# Patient Record
Sex: MALE | Race: WHITE | NOT HISPANIC OR LATINO | URBAN - NONMETROPOLITAN AREA
[De-identification: names, ages, dates, MRNs, and addresses within clinical notes are randomized per-mention and may not be internally consistent; named-entity substitution may affect disease eponyms.]

---

## 2020-11-30 ENCOUNTER — TELEPHONE (OUTPATIENT)
Dept: URGENT CARE | Facility: CLINIC | Age: 25
End: 2020-11-30

## 2021-05-03 PROCEDURE — U0004 COV-19 TEST NON-CDC HGH THRU: HCPCS | Performed by: NURSE PRACTITIONER

## 2021-09-02 ENCOUNTER — HOSPITAL ENCOUNTER (EMERGENCY)
Facility: HOSPITAL | Age: 26
Discharge: LEFT BEFORE DISCHARGE | End: 2021-09-02

## 2021-09-02 VITALS
BODY MASS INDEX: 29.89 KG/M2 | HEIGHT: 66 IN | TEMPERATURE: 98.3 F | SYSTOLIC BLOOD PRESSURE: 143 MMHG | WEIGHT: 186 LBS | HEART RATE: 93 BPM | OXYGEN SATURATION: 97 % | DIASTOLIC BLOOD PRESSURE: 93 MMHG | RESPIRATION RATE: 18 BRPM

## 2021-09-02 PROCEDURE — 99211 OFF/OP EST MAY X REQ PHY/QHP: CPT

## 2022-06-22 PROCEDURE — U0004 COV-19 TEST NON-CDC HGH THRU: HCPCS | Performed by: NURSE PRACTITIONER

## 2024-04-26 ENCOUNTER — OFFICE VISIT (OUTPATIENT)
Dept: UROLOGY | Facility: CLINIC | Age: 29
End: 2024-04-26
Payer: COMMERCIAL

## 2024-04-26 VITALS
HEART RATE: 103 BPM | WEIGHT: 185 LBS | OXYGEN SATURATION: 97 % | HEIGHT: 66 IN | DIASTOLIC BLOOD PRESSURE: 86 MMHG | SYSTOLIC BLOOD PRESSURE: 150 MMHG | BODY MASS INDEX: 29.73 KG/M2

## 2024-04-26 DIAGNOSIS — N48.1 BALANITIS: Primary | ICD-10-CM

## 2024-04-26 RX ORDER — AZITHROMYCIN 250 MG/1
TABLET, FILM COATED ORAL
COMMUNITY
Start: 2024-01-03 | End: 2024-04-26

## 2024-04-26 RX ORDER — CLOTRIMAZOLE AND BETAMETHASONE DIPROPIONATE 10; .64 MG/G; MG/G
1 CREAM TOPICAL 2 TIMES DAILY
Qty: 45 G | Refills: 1 | Status: SHIPPED | OUTPATIENT
Start: 2024-04-26

## 2024-04-26 NOTE — PROGRESS NOTES
Office Visit General Male New     Patient Name: Michael Steiner  : 1995   MRN: [id number]     Chief Complaint:   Chief Complaint   Patient presents with    [facility]     New patient   Circumcision consult.       Referring Provider: Valeria Mccauley*    History of Present Illness: Michael Steiner is a 29 y.o. male who is overall a well young adult.  He reports that he has noticed increased yeast infections of the glans.  He has treated himself 3-4 times in the past month with Monistat cream and Nystatin.  He is not having symptoms at this time and would like to be evaluated for circumcision.      Subjective      Review of System:   Review of Systems   Constitutional:  Negative for chills, diaphoresis and fever.   Gastrointestinal:  Negative for abdominal pain, constipation, diarrhea and vomiting.   Genitourinary:  Negative for difficulty urinating, dysuria, flank pain, frequency, hematuria, nocturia, erectile dysfunction, urgency and urinary incontinence.      Past Medical History:   Past Medical History:   Diagnosis Date    No pertinent past medical history      Past Surgical History:   Past Surgical History:   Procedure Laterality Date    NO PAST SURGERIES       Family History:   Family History   Problem Relation Age of Onset    No Known Problems Father     Lymphoma Mother     No Known Problems Brother      Social History:   Social History     Socioeconomic History    Marital status: Single   Tobacco Use    Smoking status: Never     Passive exposure: Never    Smokeless tobacco: Never   Vaping Use    Vaping status: Never Used   Substance and Sexual Activity    Alcohol use: Yes     Comment: occasionally    Drug use: Never    Sexual activity: Yes     Partners: Female     Medications:   No current outpatient medications on file.    Allergies:   No Known Allergies    Objective     Physical Exam:   Vital Signs:   Vitals:    24 1255   BP: 150/86   Pulse: 103   SpO2: 97%   Weight:  "83.9 kg (185 lb)   Height: 167.6 cm (66\")   PainSc: 0-No pain     Body mass index is 29.86 kg/m².   Physical Exam  Constitutional:       General: He is not in acute distress.     Appearance: Normal appearance. He is overweight. He is not ill-appearing.   HENT:      Head: Normocephalic and atraumatic.   Pulmonary:      Effort: Pulmonary effort is normal.   Abdominal:      General: Abdomen is protuberant.      Palpations: Abdomen is soft.      Hernia: There is no hernia in the left inguinal area or right inguinal area.   Genitourinary:     Pubic Area: No rash.       Penis: Uncircumcised. No phimosis, erythema, tenderness, discharge or lesions.       Testes: Normal.      Epididymis:      Right: Normal.      Left: Normal.      Comments: Foreskin is loose and easily retractable.    Skin:     General: Skin is warm.   Neurological:      General: No focal deficit present.      Mental Status: He is alert and oriented to person, place, and time.   Psychiatric:         Mood and Affect: Mood normal.         Behavior: Behavior normal.     Labs     Lab Results   Component Value Date    GLUCOSE 94 07/16/2016    CALCIUM 9.9 07/16/2016     07/16/2016    K 3.8 07/16/2016    CO2 27 07/16/2016    CL 97 (L) 07/16/2016    BUN 9 07/16/2016    CREATININE 1.2 07/16/2016    BCR 8.1 07/16/2016    ANIONGAP 21 (H) 07/16/2016       Lab Results   Component Value Date    WBC 5.6 07/16/2016    HGB 15.0 07/16/2016    HCT 43 07/16/2016    MCV 85.2 07/16/2016     07/16/2016     Images:   No Images in the past 120 days found..      Assessment / Plan      .Assessment  Mr. Steiner is a 29 y.o. male who presents with complaints of occasional balanitis that is exacerbated when his significant other has a yeast infection. He is without signs and symptoms today.  He would like to discuss circumcision.  We discussed risks of surgery to include risk of bleeding, infection, reduction of penile sensation, scar tenderness, damage to nearby structures " and other organs, and anesthesia complications.      Starting Mr. Steiner on Lotrisone ointment and will have him follow up with Dr. Leonard in 2-4 weeks     Plan  1) start Lotrisone cream  2) follow up with Dr. Leonard in 2-4 weeks.      Follow Up:   Return for with Dr. Leonard in 2-4 weeks for circumcision evaluation.      Molly Camacho APRN, MSN, FNP-C  [facility]

## 2024-05-13 ENCOUNTER — OFFICE VISIT (OUTPATIENT)
Dept: UROLOGY | Facility: CLINIC | Age: 29
End: 2024-05-13
Payer: COMMERCIAL

## 2024-05-13 VITALS
WEIGHT: 195 LBS | OXYGEN SATURATION: 98 % | HEIGHT: 66 IN | SYSTOLIC BLOOD PRESSURE: 118 MMHG | DIASTOLIC BLOOD PRESSURE: 86 MMHG | BODY MASS INDEX: 31.34 KG/M2 | HEART RATE: 96 BPM

## 2024-05-13 DIAGNOSIS — N48.1 BALANITIS: Primary | ICD-10-CM

## 2024-05-13 NOTE — PROGRESS NOTES
"CC  balanitis    HPI  Mr. Steiner is a 29 y.o. male with history below in assessment, who presents for follow up.     At this visit desires circumcision due to recurrent balanitis 3-4 x in past 6 mo    Past Medical History:   Diagnosis Date    No pertinent past medical history        Past Surgical History:   Procedure Laterality Date    NO PAST SURGERIES           Current Outpatient Medications:     clotrimazole-betamethasone (LOTRISONE) 1-0.05 % cream, Apply 1 Application topically to the appropriate area as directed 2 (Two) Times a Day., Disp: 45 g, Rfl: 1     Physical Exam  Visit Vitals  /86 (BP Location: Left arm, Patient Position: Sitting, Cuff Size: Adult)   Pulse 96   Ht 167.6 cm (66\")   Wt 88.5 kg (195 lb)   SpO2 98%   BMI 31.47 kg/m²       Labs  Brief Urine Lab Results       None            Lab Results   Component Value Date    GLUCOSE 94 07/16/2016    CALCIUM 9.9 07/16/2016     07/16/2016    K 3.8 07/16/2016    CO2 27 07/16/2016    CL 97 (L) 07/16/2016    BUN 9 07/16/2016    CREATININE 1.2 07/16/2016    BCR 8.1 07/16/2016    ANIONGAP 21 (H) 07/16/2016       Lab Results   Component Value Date    WBC 5.6 07/16/2016    HGB 15.0 07/16/2016    HCT 43 07/16/2016    MCV 85.2 07/16/2016     07/16/2016          No results found for: \"PSA\"          Radiographic Studies  No Images in the past 120 days found..    I have reviewed above labs and imaging.     Assessment  29 y.o. male with recurrent balanitis/phimosis.    Plan  1. Schedule for circumcision. Risks for separation are obesity with SP fat pad    "

## 2024-05-28 ENCOUNTER — OUTSIDE FACILITY SERVICE (OUTPATIENT)
Dept: UROLOGY | Facility: CLINIC | Age: 29
End: 2024-05-28
Payer: COMMERCIAL

## 2024-05-28 DIAGNOSIS — N47.1 PHIMOSIS: Primary | ICD-10-CM

## 2024-05-28 RX ORDER — DOCUSATE SODIUM 100 MG/1
100 CAPSULE, LIQUID FILLED ORAL 2 TIMES DAILY
Qty: 15 CAPSULE | Refills: 1 | Status: SHIPPED | OUTPATIENT
Start: 2024-05-28

## 2024-05-28 RX ORDER — SULFAMETHOXAZOLE AND TRIMETHOPRIM 800; 160 MG/1; MG/1
1 TABLET ORAL 2 TIMES DAILY
Qty: 6 TABLET | Refills: 0 | Status: SHIPPED | OUTPATIENT
Start: 2024-05-28

## 2024-05-28 RX ORDER — OXYCODONE HYDROCHLORIDE 5 MG/1
5 TABLET ORAL EVERY 6 HOURS PRN
Qty: 5 TABLET | Refills: 0 | Status: SHIPPED | OUTPATIENT
Start: 2024-05-28

## 2024-05-28 RX ORDER — ACETAMINOPHEN 500 MG
1000 TABLET ORAL EVERY 6 HOURS
Qty: 30 TABLET | Refills: 0 | Status: SHIPPED | OUTPATIENT
Start: 2024-05-28 | End: 2024-06-01

## 2024-05-29 ENCOUNTER — TELEPHONE (OUTPATIENT)
Dept: UROLOGY | Facility: CLINIC | Age: 29
End: 2024-05-29
Payer: COMMERCIAL

## 2024-05-29 NOTE — TELEPHONE ENCOUNTER
----- Message from Alexandra Li NP sent at 10/31/2023  7:38 AM CDT -----  Potassium is now higher than on last draw at 5.5. Recommend decreasing spirolactone from current 25 mg daily dose to 1/2 tab or 12.5 mg daily. Recheck BMP again in one week to reassess K+ level with dose change.   I called patient to come in to sign some forms for his FMLA that he had missed. Patient advies he had circumcision yesterday and he states a knot the size of a dime popped up on the center on the bottom of the penis by the stitches and states that it is purple. Please advise on if patient needs to be seen today.    Dionne, CMA

## 2024-06-03 ENCOUNTER — TELEPHONE (OUTPATIENT)
Dept: UROLOGY | Facility: CLINIC | Age: 29
End: 2024-06-03

## 2024-06-03 NOTE — TELEPHONE ENCOUNTER
Can we please have one of the nurse practitioners see him in clinic this week to make sure there are no complications.

## 2024-06-03 NOTE — TELEPHONE ENCOUNTER
Caller: Michael Steiner    Relationship: Self    Best call back number: [phone]    What is the best time to reach you: ASAP    Who are you requesting to speak with (clinical staff, provider,  specific staff member): CLINICAL    What was the call regarding: INCOMING CALL FROM PT STATING HE IS HAVING PAIN FROM HIS CIRCUMCISION AND WAS WANTING TO SPEAK WITH CLINICAL TO SEE IF THIS IS NORMAL OR IF HE NEEDS TO COME IN AND SEE A PROVIDER.   PT STATED HE IS TO RETURN TO WORK TOMORROW AND DOES NOT WANT TO RISK CAUSING MORE PAIN.     HUB UNABLE TO WARM TRANSFER TO CLINICAL    PLEASE CONTACT PT

## 2024-06-03 NOTE — TELEPHONE ENCOUNTER
PT CALLED BACK TO [facility] WITH NURSE.  PT ADVISED THAT WHEN HE PUTS PANTS ON IT CAUSES SWELLING IN ONE SPOT, AND PT IS CONCERNED BECAUSE HE IS DUE TO RETURN TO WORK TOMORROW.  PT ADVISED IT IS OK WHEN HE WEARS SHORTS,BUT HE CANNOT WEAR SHORTS TO WORK.    UNABLE TO WARM MORGAN,PLEASE CALL PT BACK.

## 2024-06-04 ENCOUNTER — OFFICE VISIT (OUTPATIENT)
Dept: UROLOGY | Facility: CLINIC | Age: 29
End: 2024-06-04
Payer: COMMERCIAL

## 2024-06-04 VITALS
WEIGHT: 195 LBS | DIASTOLIC BLOOD PRESSURE: 74 MMHG | RESPIRATION RATE: 12 BRPM | BODY MASS INDEX: 31.47 KG/M2 | HEART RATE: 80 BPM | SYSTOLIC BLOOD PRESSURE: 115 MMHG | TEMPERATURE: 97.8 F | OXYGEN SATURATION: 98 %

## 2024-06-04 DIAGNOSIS — Z48.816 AFTERCARE FOR CIRCUMCISION: Primary | ICD-10-CM

## 2024-06-04 PROCEDURE — 99024 POSTOP FOLLOW-UP VISIT: CPT | Performed by: NURSE PRACTITIONER

## 2024-06-04 NOTE — PROGRESS NOTES
Office Visit Established Male Patient     Patient Name: Michael Steiner  : 1995   MRN: [id number]     Chief Complaint:   Chief Complaint   Patient presents with    Wound Check       History of Present Illness: Mr. Michael Steiner is a 29 y.o. male who presents today for follow up incision check after circumcision that was performed on May 28th.  He reports that he experienced pain and ventral swelling of at the surgical incision site. It has slowly started to improve but it continues to be painful and edematous.  Would like to have it checked today.  Denies fever, chills, and sweats. No drainage.      Subjective      Review of System: ROS reviewed by me and is negative unless otherwise noted in HPI.        Past Medical History:   Past Medical History:   Diagnosis Date    No pertinent past medical history        Past Surgical History:   Past Surgical History:   Procedure Laterality Date    NO PAST SURGERIES         Family History:   Family History   Problem Relation Age of Onset    No Known Problems Father     Lymphoma Mother     No Known Problems Brother        Social History:   Social History     Socioeconomic History    Marital status: Single   Tobacco Use    Smoking status: Never     Passive exposure: Never    Smokeless tobacco: Never   Vaping Use    Vaping status: Never Used   Substance and Sexual Activity    Alcohol use: Yes     Comment: occasionally    Drug use: Never    Sexual activity: Yes     Partners: Female       Medications:   No current outpatient medications on file.    Allergies:   No Known Allergies    Objective     Physical Exam:   Vital Signs:   Vitals:    24 0934   BP: 115/74   BP Location: Left arm   Patient Position: Sitting   Cuff Size: Adult   Pulse: 80   Resp: 12   Temp: 97.8 °F (36.6 °C)   TempSrc: Temporal   SpO2: 98%   Weight: 88.5 kg (195 lb)   PainSc:   2     Body mass index is 31.47 kg/m².   Physical Exam  Vitals and nursing note reviewed.   Constitutional:        General: He is not in acute distress.     Appearance: Normal appearance. He is not ill-appearing.   HENT:      Head: Normocephalic and atraumatic.   Pulmonary:      Effort: Pulmonary effort is normal.   Genitourinary:     Comments: Ventral swelling at surgical site at base of glans.  No evidence of infection.  Sutures intact  Skin:     General: Skin is warm and dry.   Neurological:      General: No focal deficit present.      Mental Status: He is alert and oriented to person, place, and time.   Psychiatric:         Mood and Affect: Mood normal.         Behavior: Behavior normal.      Labs  Brief Urine Lab Results       None          Lab Results   Component Value Date    GLUCOSE 94 07/16/2016    CALCIUM 9.9 07/16/2016     07/16/2016    K 3.8 07/16/2016    CO2 27 07/16/2016    CL 97 (L) 07/16/2016    BUN 9 07/16/2016    CREATININE 1.2 07/16/2016    BCR 8.1 07/16/2016    ANIONGAP 21 (H) 07/16/2016       Lab Results   Component Value Date    WBC 5.6 07/16/2016    HGB 15.0 07/16/2016    HCT 43 07/16/2016    MCV 85.2 07/16/2016     07/16/2016     Radiographic Studies  No Images in the past 120 days found.    I have reviewed the above labs and imaging.     Assessment / Plan      Assessment/Plan:  Mr. Michael Steiner is a 29 y.o. male who presents today for follow up incision check after circumcision that was performed on May 28th.  He reports that he experienced pain and ventral swelling of at the surgical incision site. It has slowly started to improve but it continues to be painful and edematous.  Surgical incision appears to be healing well and there is no evidence of infection.  Continues to have edema.  Will follow up with Mr. Steiner in 1 week to evaluate incision.  Continue to remain off of work as discussed.      Diagnoses and all orders for this visit:    1. Aftercare for circumcision (Primary)       Follow Up:   Return for return in 1 week for incision check .      Molly Camacho, APRN, MSN,  ADRIÁN  [facility]

## 2024-06-11 ENCOUNTER — OFFICE VISIT (OUTPATIENT)
Dept: UROLOGY | Facility: CLINIC | Age: 29
End: 2024-06-11
Payer: COMMERCIAL

## 2024-06-11 VITALS
HEART RATE: 119 BPM | OXYGEN SATURATION: 99 % | BODY MASS INDEX: 31.47 KG/M2 | RESPIRATION RATE: 12 BRPM | WEIGHT: 195 LBS | TEMPERATURE: 97.6 F

## 2024-06-11 DIAGNOSIS — Z48.816 AFTERCARE FOR CIRCUMCISION: Primary | ICD-10-CM

## 2024-06-11 PROCEDURE — 99024 POSTOP FOLLOW-UP VISIT: CPT | Performed by: NURSE PRACTITIONER

## 2024-06-11 NOTE — PROGRESS NOTES
Office Visit Established Male Patient     Patient Name: Michael Steiner  : 1995   MRN: [id number]     Chief Complaint:   Chief Complaint   Patient presents with    Follow-up       History of Present Illness:  Mr. Michael Steiner is a 29 y.o. male who presents today for follow up incision check after circumcision that was performed on May 28th.  He reports that pain has improved.  Rates pain 1/10 when nothing is touching penis, 4/10 when he wears pants.  Does not feel like pain is tolerable when he is wearing pants and is concerned about returning to work.  Denies fever, chills, and sweats. No drainage.      Subjective      Review of System: ROS reviewed by me and is negative unless otherwise noted in HPI.       Past Medical History:   Past Medical History:   Diagnosis Date    No pertinent past medical history      Past Surgical History:   Past Surgical History:   Procedure Laterality Date    NO PAST SURGERIES       Family History:   Family History   Problem Relation Age of Onset    No Known Problems Father     Lymphoma Mother     No Known Problems Brother      Social History:   Social History     Socioeconomic History    Marital status: Single   Tobacco Use    Smoking status: Never     Passive exposure: Never    Smokeless tobacco: Never   Vaping Use    Vaping status: Never Used   Substance and Sexual Activity    Alcohol use: Yes     Comment: occasionally    Drug use: Never    Sexual activity: Yes     Partners: Female     Medications:   No current outpatient medications on file.    Allergies:   No Known Allergies    Objective     Physical Exam:   Vital Signs:   Vitals:    24 1327   Pulse: 119   Resp: 12   Temp: 97.6 °F (36.4 °C)   TempSrc: Temporal   SpO2: 99%   Weight: 88.5 kg (195 lb)   PainSc:   1   PainLoc: Penis     Body mass index is 31.47 kg/m².   Physical Exam  Vitals and nursing note reviewed. Exam conducted with a chaperone present.   Constitutional:       General: He is not in  acute distress.     Appearance: Normal appearance. He is not ill-appearing.   HENT:      Head: Normocephalic and atraumatic.   Pulmonary:      Effort: Pulmonary effort is normal.   Genitourinary:     Comments: Ventral swelling at surgical site at base of glans.  Erythema has improved.  No evidence of infection.  Sutures intact  Skin:     General: Skin is warm and dry.   Neurological:      General: No focal deficit present.      Mental Status: He is alert and oriented to person, place, and time.   Psychiatric:         Mood and Affect: Mood normal.         Behavior: Behavior normal.        Labs  Brief Urine Lab Results       None          Lab Results   Component Value Date    GLUCOSE 94 07/16/2016    CALCIUM 9.9 07/16/2016     07/16/2016    K 3.8 07/16/2016    CO2 27 07/16/2016    CL 97 (L) 07/16/2016    BUN 9 07/16/2016    CREATININE 1.2 07/16/2016    BCR 8.1 07/16/2016    ANIONGAP 21 (H) 07/16/2016     Lab Results   Component Value Date    WBC 5.6 07/16/2016    HGB 15.0 07/16/2016    HCT 43 07/16/2016    MCV 85.2 07/16/2016     07/16/2016     Radiographic Studies  No Images in the past 120 days found.    I have reviewed the above labs and imaging.     Assessment / Plan      Assessment/Plan: Mr. Michael Steiner is a 29 y.o. male who presents today for follow up incision check after circumcision that was performed on May 28th. Pain has improved but not yet resolved.  He is concerned about returning to work too soon.  States that his job requires heavy lifting of large objects that he will have to carry close to his person.      I have extended his work excuse for 1 additional week.  Advised him to apply white petroleum jelly twice daily and wrap with coban or gauze to decrease swelling.  We discussed that he still needs time to heal.  Will follow up with Mr. Steiner in 2 weeks to evaluate incision.     Diagnoses and all orders for this visit:    1. Aftercare for circumcision (Primary)     Follow Up:    Return in about 2 weeks (around 6/25/2024) for for incision check .      DARIO Adair, MSN, FNP-C  [facility] [place]

## 2024-06-17 ENCOUNTER — TELEPHONE (OUTPATIENT)
Dept: UROLOGY | Facility: CLINIC | Age: 29
End: 2024-06-17

## 2024-06-17 NOTE — TELEPHONE ENCOUNTER
Caller: BENY ROSE    Relationship: SELF    Best call back number: [phone] (home)     What is the best time to reach you: ANY    Who are you requesting to speak with (clinical staff, provider,  specific staff member): JAMA OR STAFF    Do you know the name of the person who called: PAT CALLED OFFICE    What was the call regarding: PT WAS TOLD ON 6/11/24, LAST VISIT, THAT HE WAS SUPPOSED TO GO BACK TO [employer] IN 1 WEEK. THAT WOULD 6/18/24. PT WORKS MIDNIGHTS AND WANT TO KNOW IF HE SHOULD BE GOING BACK TONIGHT OR TOMORROW. PLEASE CALL BACK ASAP TO DISCUSS. THANK YOU.

## 2024-06-17 NOTE — TELEPHONE ENCOUNTER
Spoke with him and advised that he could go tonight if he felt good or wait until tomorrow night since he works overnight.

## 2024-06-27 ENCOUNTER — OFFICE VISIT (OUTPATIENT)
Dept: UROLOGY | Facility: CLINIC | Age: 29
End: 2024-06-27
Payer: COMMERCIAL

## 2024-06-27 VITALS
HEART RATE: 75 BPM | BODY MASS INDEX: 31.34 KG/M2 | DIASTOLIC BLOOD PRESSURE: 64 MMHG | WEIGHT: 195 LBS | OXYGEN SATURATION: 94 % | HEIGHT: 66 IN | SYSTOLIC BLOOD PRESSURE: 120 MMHG

## 2024-06-27 DIAGNOSIS — Z48.816 AFTERCARE FOR CIRCUMCISION: Primary | ICD-10-CM

## 2024-06-27 LAB
BILIRUB BLD-MCNC: NEGATIVE MG/DL
CLARITY, POC: CLEAR
COLOR UR: YELLOW
EXPIRATION DATE: NORMAL
GLUCOSE UR STRIP-MCNC: NEGATIVE MG/DL
KETONES UR QL: NEGATIVE
LEUKOCYTE EST, POC: NEGATIVE
Lab: NORMAL
NITRITE UR-MCNC: NEGATIVE MG/ML
PH UR: 6 [PH] (ref 5–8)
PROT UR STRIP-MCNC: NEGATIVE MG/DL
RBC # UR STRIP: NEGATIVE /UL
SP GR UR: 1.01 (ref 1–1.03)
UROBILINOGEN UR QL: NORMAL

## 2024-06-27 NOTE — PROGRESS NOTES
"       Office Visit Established Male Patient     Patient Name: Michael Steiner  : 1995   MRN: [id number]     Chief Complaint:   Chief Complaint   Patient presents with    Follow-up     1mo follow up for post circumcision wound check.       History of Present Illness: Mr. Michael Steiner is a 29 y.o. male who presents today for a 1 month follow up incision check after circumcision on May 28th.  He reports that pain is minimal.  Glans is sensitive.  Swelling has improved but not fully resolved.  Working without difficulty.        Subjective      Review of System: ROS reviewed by me and is negative unless otherwise noted in HPI.      Past Medical History:   Past Medical History:   Diagnosis Date    No pertinent past medical history        Past Surgical History:   Past Surgical History:   Procedure Laterality Date    NO PAST SURGERIES         Family History:   Family History   Problem Relation Age of Onset    No Known Problems Father     Lymphoma Mother     No Known Problems Brother        Social History:   Social History     Socioeconomic History    Marital status: Single   Tobacco Use    Smoking status: Never     Passive exposure: Never    Smokeless tobacco: Never   Vaping Use    Vaping status: Never Used   Substance and Sexual Activity    Alcohol use: Yes     Comment: occasionally    Drug use: Never    Sexual activity: Yes     Partners: Female       Medications:   No current outpatient medications on file.    Allergies:   No Known Allergies    Objective     Physical Exam:   Vital Signs:   Vitals:    24 0959   BP: 120/64   Pulse: 75   SpO2: 94%   Weight: 88.5 kg (195 lb)   Height: 167.6 cm (66\")   PainSc: 2  Comment: pt states uncomfortable at times with some edema.   PainLoc: Penis     Body mass index is 31.47 kg/m².   Physical Exam  Vitals and nursing note reviewed.   Constitutional:       General: He is not in acute distress.     Appearance: Normal appearance. He is not ill-appearing.   HENT: "      Head: Normocephalic and atraumatic.   Pulmonary:      Effort: Pulmonary effort is normal.   Genitourinary:     Penis: Circumcised.       Testes: Normal.      Comments: Swelling on ventral aspect of glans has improved.  No evidence of infection.    Skin:     General: Skin is warm and dry.   Neurological:      General: No focal deficit present.      Mental Status: He is alert and oriented to person, place, and time.   Psychiatric:         Mood and Affect: Mood normal.         Behavior: Behavior normal.      Labs  Brief Urine Lab Results  (Last result in the past 365 days)        Color   Clarity   Blood   Leuk Est   Nitrite   Protein   CREAT   Urine HCG        06/27/24 1026 Yellow   Clear   Negative   Negative   Negative   Negative                   Lab Results   Component Value Date    GLUCOSE 94 07/16/2016    CALCIUM 9.9 07/16/2016     07/16/2016    K 3.8 07/16/2016    CO2 27 07/16/2016    CL 97 (L) 07/16/2016    BUN 9 07/16/2016    CREATININE 1.2 07/16/2016    BCR 8.1 07/16/2016    ANIONGAP 21 (H) 07/16/2016       Lab Results   Component Value Date    WBC 5.6 07/16/2016    HGB 15.0 07/16/2016    HCT 43 07/16/2016    MCV 85.2 07/16/2016     07/16/2016     Radiographic Studies  No Images in the past 120 days found..    I have reviewed the above labs and imaging.     Assessment / Plan      Assessment/Plan: Mr. Michael Steiner is a 29 y.o. male who presents today for a 1 month follow up incision check after circumcision on May 28th.  Incision is healing well without evidence of infection.  Dr. Leonard examined patient with me and agrees.  Swelling will continue to decrease over time.  Will follow up with Mr. Steiner in 3 months.  If doing well will have him follow up with me on an as needed basis after that time.       Diagnoses and all orders for this visit:    1. Aftercare for circumcision (Primary)  -     POC Urinalysis Dipstick, Automated    Follow Up:   Return in about 3 months (around 9/27/2024)  for Next scheduled follow up .      DARIO Adair, MSN, FNP-C  [facility]

## 2024-08-05 ENCOUNTER — TELEPHONE (OUTPATIENT)
Dept: UROLOGY | Facility: CLINIC | Age: 29
End: 2024-08-05

## 2024-08-05 NOTE — TELEPHONE ENCOUNTER
Caller: Michael Steiner     Relationship: SELF    Best call back number: [phone]     What form or medical record are you requesting: [facility] PAPERWORK UPDATE    Who is requesting this form or medical record from you: PT'S EMPLOYER    How would you like to receive the form or medical records (pick-up, mail, fax): FAX  If fax, what is the fax number: SHOULD BE ON PAPERWORK    Timeframe paperwork needed: ASAP    Additional notes: PT HAD A SURGERY ON 5-28-24 AND DUE TO SWELLING, HE ENDED UP BEING OFF WORK FOR ABOUT 3 WEEKS. HE JUST FOUND OUT THAT THE PAPERWORK FOR HIS LAST WEEK OFF HAD NOT BEEN SENT IN TO THE EMPLOYER. THEY HAVE FAXED THAT AGAIN TODAY. THE DATES ARE 6-11-24 TO 6-18-24 THAT PT NEEDS INCLUDED. PLEASE COMPLETE THAT AND SEND IT TO PT'S EMPLOYERS ASAP.     PT INCURRED POINTS WITH HIS EMPLOYER BECAUSE THEY DIDN'T GET THE PAPERWORK BACK AND THEY'RE TELLING HIM HE HAS TOO MANY POINTS.     PLEASE GIVE PT A CALL WHEN COMPLETED.

## 2024-08-05 NOTE — TELEPHONE ENCOUNTER
They had resent paperwork after I had filled it out twice for the third week you had put him off and you had said we weren't going to fill it out again. Please advise.